# Patient Record
Sex: FEMALE | Race: WHITE | NOT HISPANIC OR LATINO
[De-identification: names, ages, dates, MRNs, and addresses within clinical notes are randomized per-mention and may not be internally consistent; named-entity substitution may affect disease eponyms.]

---

## 2022-02-28 ENCOUNTER — APPOINTMENT (OUTPATIENT)
Dept: OBGYN | Facility: CLINIC | Age: 62
End: 2022-02-28

## 2022-02-28 PROBLEM — Z00.00 ENCOUNTER FOR PREVENTIVE HEALTH EXAMINATION: Status: ACTIVE | Noted: 2022-02-28

## 2022-03-14 ENCOUNTER — APPOINTMENT (OUTPATIENT)
Dept: RHEUMATOLOGY | Facility: CLINIC | Age: 62
End: 2022-03-14

## 2022-04-04 ENCOUNTER — APPOINTMENT (OUTPATIENT)
Dept: ORTHOPEDIC SURGERY | Facility: CLINIC | Age: 62
End: 2022-04-04

## 2022-04-04 ENCOUNTER — NON-APPOINTMENT (OUTPATIENT)
Age: 62
End: 2022-04-04

## 2022-04-04 DIAGNOSIS — M18.0 BILATERAL PRIMARY OSTEOARTHRITIS OF FIRST CARPOMETACARPAL JOINTS: ICD-10-CM

## 2022-04-04 DIAGNOSIS — M79.641 PAIN IN RIGHT HAND: ICD-10-CM

## 2022-04-04 DIAGNOSIS — M79.642 PAIN IN RIGHT HAND: ICD-10-CM

## 2022-04-04 PROCEDURE — 20550 NJX 1 TENDON SHEATH/LIGAMENT: CPT

## 2022-04-04 PROCEDURE — 99204 OFFICE O/P NEW MOD 45 MIN: CPT | Mod: 1L,25

## 2022-05-23 ENCOUNTER — APPOINTMENT (OUTPATIENT)
Dept: ORTHOPEDIC SURGERY | Facility: CLINIC | Age: 62
End: 2022-05-23
Payer: COMMERCIAL

## 2022-05-23 ENCOUNTER — NON-APPOINTMENT (OUTPATIENT)
Age: 62
End: 2022-05-23

## 2022-05-23 VITALS
BODY MASS INDEX: 22.76 KG/M2 | HEIGHT: 67 IN | SYSTOLIC BLOOD PRESSURE: 89 MMHG | WEIGHT: 145 LBS | HEART RATE: 67 BPM | DIASTOLIC BLOOD PRESSURE: 50 MMHG

## 2022-05-23 DIAGNOSIS — M77.12 LATERAL EPICONDYLITIS, RIGHT ELBOW: ICD-10-CM

## 2022-05-23 DIAGNOSIS — M77.11 LATERAL EPICONDYLITIS, RIGHT ELBOW: ICD-10-CM

## 2022-05-23 PROCEDURE — 99213 OFFICE O/P EST LOW 20 MIN: CPT

## 2022-05-23 NOTE — ADDENDUM
[FreeTextEntry1] : INimisha assisted with documentation on 05/23/2022  acting as scribe for Dr. Kelly Holt.

## 2022-05-23 NOTE — ASSESSMENT
[FreeTextEntry1] : 62 year old female presents for reevaluation of bilateral elbow pain secondary to lateral epicondylitis. She is s/p bilateral cortisone injection 4/4 with improvement in symptoms.  \par \par Nature and history of the condition was discussed at length. Concerning the elbow pain, she is currently under the care of physical therapy and participating in treatment weekly. Renewal of prescription for physical therapy given today.  She will continue OTC NSAIDs and topical Voltaren gel as needed. She will follow up with me or with Dr. Leon on an as needed basis.\par \par All questions and concerns have been addressed, and the patient is in agreement with the above plan.

## 2022-05-23 NOTE — PHYSICAL EXAM
[de-identified] : Bilateral elbow: The elbow has a range of motion from 0° to 140° of flexion bilaterally. There is 80° of pronation and 80° of supination bilaterally. There is no palpable effusion bilaterally. Test for instability are negative including posterolateral rotatory instability and valgus instability bilaterally. There is no tenderness along the posterior lateral or posterior medial joint line. No tenderness of the biceps and triceps which are intact with 5 over 5 strength bilaterally. No pain upon forced flexion or extension of the elbows bilaterally. There is tenderness to palpation over the bilaterally lateral epicondyles and specifically over the ECRB origin. There is pain with resisted wrist extension as well as resisted long finger extension which localizes to the lateral epicondyle bilaterally. No evidence of intrinsic or extrinsic atrophy bilaterally. There is no sensitivity of the radial nerve within the radial tunnel with provocative movements. No sensitivity over the median and ulnar nerves bilaterally. [de-identified] : No additional imaging done today.

## 2022-05-23 NOTE — HISTORY OF PRESENT ILLNESS
[FreeTextEntry1] : 4/4/22: 62 year female presents for evaluation of bilateral elbow pain. She reports her symptoms have been present for about 1 month on the right side and intermittent for the last year on the left. She notes pain is localized to the region of the lateral epicondyles bilaterally with radiation into the forearm. She notes her pain is worsened with activity and mild at rest. She has been under the care of physical therapy for the last month with no relief of symptoms. She has been taking ibuprofen intermittently for pain with minimal relief. She also reports pain localized to the base of the bilateral thumbs with radiation circumferentially, also worsened with activity. She notes occasional burning pain to the bilateral upper extremities with a known history of cervical spine stenosis on MRI. She also reports intermittent muscle spasm mostly localized to the left periscapular region. She also reports a recent EMG in the last year revealing mild carpal tunnel syndrome bilaterally, denies paresthesias regularly. \par \par 5/23: Patient presents for reevaluation of bilateral elbow pain. She is status post bilateral cortisone injection 4/4. She reports an improvement in symptoms with physical therapy and injections.

## 2022-07-14 NOTE — PROCEDURE
[FreeTextEntry1] : bilateral lateral epicondylitis cortisone injection: \par skin prepped with alcohol, anesthetized with ethyl chloride spray. 1 cc Depo-Medro + 0.5 cc 1% plain lidocaine injected to the lateral epicondyles bilaterally at the point of maximal tenderness.  Band-Aid applied bilaterally, patient tolerated the injections well.

## 2022-07-14 NOTE — ASSESSMENT
[FreeTextEntry1] : 62-year-old female presents with bilateral elbow pain as well as bilateral thumb pain, exam findings consistent with bilateral lateral epicondylitis as well as early basal joint arthritis bilaterally.\par Patient informed of the findings and the nature and history of both conditions have been discussed at length.\par Concerning the elbow pain, she is currently under the care of physical therapy and participating in treatment weekly.  She will continue with physical therapy as well as as needed use of anti-inflammatory (topical or oral) for pain control.  She has also been recommended use of a wrist immobilizer versus counterforce braces for the elbows during activity.  She received bilateral steroid injections to the elbows today and tolerated the procedures well. \par Concerning the bilateral thumb pain, she has been recommended for conservative treatment for early basal joint arthritis with use of topical anti-inflammatory as well as comfort cool thumb braces as needed during activity. \par She will follow up in 4 to 6 weeks for reevaluation of both conditions. All questions and concerns have been addressed, and the patient is in agreement with the above plan.

## 2022-07-14 NOTE — PHYSICAL EXAM
[de-identified] : Bilateral elbow: The elbow has a range of motion from 0° to 140° of flexion bilaterally. There is 80° of pronation and 80° of supination bilaterally. There is no palpable effusion bilaterally. Test for instability are negative including posterolateral rotatory instability and valgus instability bilaterally. There is no tenderness along the posterior lateral or posterior medial joint line. No tenderness of the biceps and triceps which are intact with 5 over 5 strength bilaterally. No pain upon forced flexion or extension of the elbows bilaterally. There is tenderness to palpation over the bilaterally lateral epicondyles and specifically over the ECRB origin. There is pain with resisted wrist extension as well as resisted long finger extension which localizes to the lateral epicondyle bilaterally.  No evidence of intrinsic or extrinsic atrophy bilaterally. There is no sensitivity of the radial nerve within the radial tunnel with provocative movements. No sensitivity over the median and ulnar nerves bilaterally.\par \par There is tenderness over the bilateral basal joint with a positive grind test.  There is no shoulder deformity. There is a positive crank test. There is swelling appreciated over the affected CMC joints.. There is no evidence of infection or lymphadenopathy bilaterally to the level of the elbows There is no evidence of MCP hyperextension bilaterally. There is a negative Finkelstein's test There is no tenderness over the A-1 pulleys bilaterally. 5/5 strength bilaterally. \par \par The wrists have a symmetric and full range of motion bilaterally with no pain upon forced flexion, extension, pronation and supination. There is no tenderness over the scaphoid scapholunate region ligaments and no tenderness of the TFCC bilaterally. There is no tenderness of the pisotriquetral hamate hook. The second through fifth CMC his is stable and nontender bilaterally.\par  [de-identified] : The following radiographs were ordered and read by me during this patients visit. I reviewed each radiograph in detail with the patient and discussed the findings as highlighted below. \par 3 x-ray views of the bilateral hands reveal no acute fracture.  There is evidence of early basal joint arthritis bilaterally

## 2022-07-14 NOTE — HISTORY OF PRESENT ILLNESS
[FreeTextEntry1] : 62 year female presents for evaluation of bilateral elbow pain. She reports her symptoms have been present for about 1 month on the right side and intermittent for the last year on the left.  She notes pain is localized to the region of the lateral epicondyles bilaterally with radiation into the forearm.  She notes her pain is worsened with activity and mild at rest.  She has been under the care of physical therapy for the last month with no relief of symptoms.  She has been taking ibuprofen intermittently for pain with minimal relief.  She also reports pain localized to the base of the bilateral thumbs with radiation circumferentially, also worsened with activity.  She notes occasional burning pain to the bilateral upper extremities with a known history of cervical spine stenosis on MRI.  She also reports intermittent muscle spasm mostly localized to the left periscapular region.  She also reports a recent EMG in the last year revealing mild carpal tunnel syndrome bilaterally, denies paresthesias regularly.

## 2022-09-26 ENCOUNTER — APPOINTMENT (OUTPATIENT)
Dept: ORTHOPEDIC SURGERY | Facility: CLINIC | Age: 62
End: 2022-09-26

## 2022-09-26 ENCOUNTER — NON-APPOINTMENT (OUTPATIENT)
Age: 62
End: 2022-09-26

## 2022-09-26 PROCEDURE — 99213 OFFICE O/P EST LOW 20 MIN: CPT

## 2022-09-26 PROCEDURE — 73030 X-RAY EXAM OF SHOULDER: CPT | Mod: RT

## 2022-09-26 NOTE — DISCUSSION/SUMMARY
[de-identified] : 62-year-old female with right shoulder pain ongoing for approximately 1 year and limiting her yoga\par \par Due to the chronic and progressive nature of the symptoms conveying physical exam findings I gave her a prescription for an MRI.  I also recommended Voltaren gel to try.  We discussed the potential causes of this pain including rotator cuff tears which can be treated conservatively with physical therapy or possibly operatively if her repair is needed.  We also discussed that injections may be indicated depending on what the MRI shows.  I told the patient I will call her back when the MRI results are in to discuss the neck step.

## 2022-09-26 NOTE — PHYSICAL EXAM
[de-identified] : General Appearance: normal without acute distress\par Mental: Alert and oriented x 3\par Psych/affect: appropriate, cooperative\par Gait: balance appropriate for age, [normal] gait\par \par Bilateral Shoulders\par \par Nontender to palpation\par \par ROM:\par FF: [180] degrees\par ER: [90] degrees\par IR behind back: [T7]\par \par RTC:\par Empty Can Sign: [Negative]\par ER lag Sign: [Negative]\par Hownblower´s Sign: [Negative]\par Belly Press/Lift Off Test: Mildly positive on right\par Impingement:\par Dougherty/Neer Sign: [Negative]\par ACJ:\par Cross Arm Adduction Test: [Negative]\par [place]'s Test - Superficial: [Negative]\par Labrum:\par [place]'s Test - Deep: [Negative]\par Biceps:\par Speed's Test: [Negative]\par Instability:\par Apprehension/Relocation: [Negative]\par \par Grossly motor and sensory intact [de-identified] : 4 views of the right shoulder taken today in the office reviewed–no fractures or dislocations, no abnormalities appreciated, no signs of arthritis

## 2022-09-26 NOTE — HISTORY OF PRESENT ILLNESS
[de-identified] : 62-year-old female presents for right shoulder pain.  Patient states has been ongoing for approximately 1 year.  States that no inciting event but it seems that it has been getting progressively worse.  States she is very active in particular at bothers her when she is doing yoga, which she does a lot.  Describes the pain as deep on the lateral aspect of her shoulder.  Pain is mostly dull in nature does not radiate, denies numbness or tingling.  Has been taking Advil although this did not seem particularly helpful.  No prior treatments to the shoulder.  Allergies to penicillin, denies anticoagulation, social alcohol use, denies tobacco use, past medical significant for hyperlipidemia.

## 2022-10-04 ENCOUNTER — OUTPATIENT (OUTPATIENT)
Dept: OUTPATIENT SERVICES | Facility: HOSPITAL | Age: 62
LOS: 1 days | End: 2022-10-04
Payer: COMMERCIAL

## 2022-10-04 ENCOUNTER — APPOINTMENT (OUTPATIENT)
Dept: MRI IMAGING | Facility: CLINIC | Age: 62
End: 2022-10-04

## 2022-10-04 DIAGNOSIS — M25.511 PAIN IN RIGHT SHOULDER: ICD-10-CM

## 2022-10-04 PROCEDURE — 73221 MRI JOINT UPR EXTREM W/O DYE: CPT | Mod: 26,RT

## 2022-10-04 PROCEDURE — 73221 MRI JOINT UPR EXTREM W/O DYE: CPT

## 2022-10-10 ENCOUNTER — APPOINTMENT (OUTPATIENT)
Dept: ORTHOPEDIC SURGERY | Facility: CLINIC | Age: 62
End: 2022-10-10

## 2022-10-10 DIAGNOSIS — M25.511 PAIN IN RIGHT SHOULDER: ICD-10-CM

## 2022-10-10 DIAGNOSIS — G89.29 PAIN IN RIGHT SHOULDER: ICD-10-CM

## 2022-10-10 PROCEDURE — 99446 NTRPROF PH1/NTRNET/EHR 5-10: CPT

## 2022-10-12 ENCOUNTER — APPOINTMENT (OUTPATIENT)
Dept: PEDIATRIC DEVELOPMENTAL SERVICES | Facility: CLINIC | Age: 62
End: 2022-10-12

## 2022-10-12 PROBLEM — Z00.00 ENCOUNTER FOR PREVENTIVE HEALTH EXAMINATION: Status: ACTIVE | Noted: 2022-10-12

## 2022-10-12 PROCEDURE — 90853 GROUP PSYCHOTHERAPY: CPT | Mod: 95

## 2022-10-19 ENCOUNTER — APPOINTMENT (OUTPATIENT)
Dept: PEDIATRIC DEVELOPMENTAL SERVICES | Facility: CLINIC | Age: 62
End: 2022-10-19

## 2022-10-19 PROCEDURE — 90853 GROUP PSYCHOTHERAPY: CPT | Mod: 95

## 2022-10-23 ENCOUNTER — TRANSCRIPTION ENCOUNTER (OUTPATIENT)
Age: 62
End: 2022-10-23

## 2022-10-26 ENCOUNTER — APPOINTMENT (OUTPATIENT)
Dept: PEDIATRIC DEVELOPMENTAL SERVICES | Facility: CLINIC | Age: 62
End: 2022-10-26

## 2022-10-26 PROCEDURE — 90853 GROUP PSYCHOTHERAPY: CPT | Mod: 95

## 2022-11-02 ENCOUNTER — APPOINTMENT (OUTPATIENT)
Dept: ORTHOPEDIC SURGERY | Facility: CLINIC | Age: 62
End: 2022-11-02

## 2022-11-02 ENCOUNTER — APPOINTMENT (OUTPATIENT)
Dept: PEDIATRIC DEVELOPMENTAL SERVICES | Facility: CLINIC | Age: 62
End: 2022-11-02

## 2022-11-02 PROCEDURE — 90853 GROUP PSYCHOTHERAPY: CPT | Mod: 95

## 2022-11-09 ENCOUNTER — APPOINTMENT (OUTPATIENT)
Dept: PEDIATRIC DEVELOPMENTAL SERVICES | Facility: CLINIC | Age: 62
End: 2022-11-09

## 2022-11-09 PROCEDURE — 90853 GROUP PSYCHOTHERAPY: CPT | Mod: 95

## 2022-11-16 ENCOUNTER — APPOINTMENT (OUTPATIENT)
Dept: PEDIATRIC DEVELOPMENTAL SERVICES | Facility: CLINIC | Age: 62
End: 2022-11-16

## 2022-11-16 PROCEDURE — 90853 GROUP PSYCHOTHERAPY: CPT | Mod: 95

## 2022-11-21 ENCOUNTER — APPOINTMENT (OUTPATIENT)
Dept: OBGYN | Facility: CLINIC | Age: 62
End: 2022-11-21

## 2022-12-19 ENCOUNTER — APPOINTMENT (OUTPATIENT)
Dept: OBGYN | Facility: CLINIC | Age: 62
End: 2022-12-19

## 2022-12-19 ENCOUNTER — APPOINTMENT (OUTPATIENT)
Dept: RHEUMATOLOGY | Facility: CLINIC | Age: 62
End: 2022-12-19

## 2022-12-19 VITALS
SYSTOLIC BLOOD PRESSURE: 112 MMHG | HEIGHT: 67 IN | WEIGHT: 152 LBS | BODY MASS INDEX: 23.86 KG/M2 | DIASTOLIC BLOOD PRESSURE: 78 MMHG

## 2022-12-19 DIAGNOSIS — Z87.39 PERSONAL HISTORY OF OTHER DISEASES OF THE MUSCULOSKELETAL SYSTEM AND CONNECTIVE TISSUE: ICD-10-CM

## 2022-12-19 DIAGNOSIS — Z12.4 ENCOUNTER FOR SCREENING FOR MALIGNANT NEOPLASM OF CERVIX: ICD-10-CM

## 2022-12-19 DIAGNOSIS — N94.10 UNSPECIFIED DYSPAREUNIA: ICD-10-CM

## 2022-12-19 DIAGNOSIS — R52 PAIN, UNSPECIFIED: ICD-10-CM

## 2022-12-19 DIAGNOSIS — Z80.42 FAMILY HISTORY OF MALIGNANT NEOPLASM OF PROSTATE: ICD-10-CM

## 2022-12-19 DIAGNOSIS — Z80.0 FAMILY HISTORY OF MALIGNANT NEOPLASM OF DIGESTIVE ORGANS: ICD-10-CM

## 2022-12-19 DIAGNOSIS — Z80.3 FAMILY HISTORY OF MALIGNANT NEOPLASM OF BREAST: ICD-10-CM

## 2022-12-19 DIAGNOSIS — Z86.39 PERSONAL HISTORY OF OTHER ENDOCRINE, NUTRITIONAL AND METABOLIC DISEASE: ICD-10-CM

## 2022-12-19 DIAGNOSIS — Z01.419 ENCOUNTER FOR GYNECOLOGICAL EXAMINATION (GENERAL) (ROUTINE) W/OUT ABNORMAL FINDINGS: ICD-10-CM

## 2022-12-19 PROCEDURE — 99386 PREV VISIT NEW AGE 40-64: CPT

## 2022-12-19 RX ORDER — CLONAZEPAM 0.5 MG/1
0.5 TABLET ORAL
Refills: 0 | Status: ACTIVE | COMMUNITY

## 2022-12-19 RX ORDER — ESTRADIOL 0.1 MG/G
0.1 CREAM VAGINAL
Qty: 1 | Refills: 0 | Status: ACTIVE | COMMUNITY
Start: 2022-12-19 | End: 1900-01-01

## 2022-12-19 RX ORDER — SERTRALINE HYDROCHLORIDE 100 MG/1
100 TABLET, FILM COATED ORAL
Refills: 0 | Status: ACTIVE | COMMUNITY

## 2022-12-19 RX ORDER — GABAPENTIN 600 MG/1
600 TABLET, COATED ORAL
Refills: 0 | Status: ACTIVE | COMMUNITY

## 2022-12-19 NOTE — HISTORY OF PRESENT ILLNESS
[No] : Patient does not have concerns regarding sex [Currently Active] : currently active [Men] : men [TextBox_4] : Yareli is a 61 y/o  (ectopic with fallopian rupture, twins) who presents today for an annual exam.\par \par She has a history of oseopenia, her last DEXA was .\par \par \par She is a Psych RN working remotely - office is in [facility]\par She states she had an abnormal pap a long time ago. No change in sexual partners for 30+ years. [TextBox_19] : FAll [TextBox_25] : FALL

## 2022-12-19 NOTE — DISCUSSION/SUMMARY
[FreeTextEntry1] : 1) pap performed\par 2) benefits of vaginal estrogen reviewed along with instructions\par 3) up to date with mammo/sono - will obtain from [facility] along with DEXA\par \par Return to office in one year, sooner prn

## 2022-12-21 LAB
CYTOLOGY CVX/VAG DOC THIN PREP: ABNORMAL
HPV HIGH+LOW RISK DNA PNL CVX: NOT DETECTED

## 2023-01-23 ENCOUNTER — OFFICE (OUTPATIENT)
Dept: URBAN - METROPOLITAN AREA CLINIC 102 | Facility: CLINIC | Age: 63
Setting detail: OPHTHALMOLOGY
End: 2023-01-23

## 2023-01-23 DIAGNOSIS — H90.3: ICD-10-CM

## 2023-01-23 PROCEDURE — 92551 PURE TONE HEARING TEST AIR: CPT | Performed by: AUDIOLOGIST

## 2023-05-24 ENCOUNTER — APPOINTMENT (OUTPATIENT)
Dept: OTOLARYNGOLOGY | Facility: CLINIC | Age: 63
End: 2023-05-24
Payer: MEDICARE

## 2023-05-24 VITALS
BODY MASS INDEX: 24.33 KG/M2 | DIASTOLIC BLOOD PRESSURE: 55 MMHG | HEART RATE: 62 BPM | TEMPERATURE: 97.2 F | WEIGHT: 155 LBS | SYSTOLIC BLOOD PRESSURE: 108 MMHG | HEIGHT: 67 IN

## 2023-05-24 DIAGNOSIS — Z78.9 OTHER SPECIFIED HEALTH STATUS: ICD-10-CM

## 2023-05-24 DIAGNOSIS — H93.291 OTHER ABNORMAL AUDITORY PERCEPTIONS, RIGHT EAR: ICD-10-CM

## 2023-05-24 DIAGNOSIS — E78.5 HYPERLIPIDEMIA, UNSPECIFIED: ICD-10-CM

## 2023-05-24 DIAGNOSIS — Z82.0 FAMILY HISTORY OF EPILEPSY AND OTHER DISEASES OF THE NERVOUS SYSTEM: ICD-10-CM

## 2023-05-24 DIAGNOSIS — Z80.9 FAMILY HISTORY OF MALIGNANT NEOPLASM, UNSPECIFIED: ICD-10-CM

## 2023-05-24 DIAGNOSIS — Z82.49 FAMILY HISTORY OF ISCHEMIC HEART DISEASE AND OTHER DISEASES OF THE CIRCULATORY SYSTEM: ICD-10-CM

## 2023-05-24 DIAGNOSIS — F90.9 ATTENTION-DEFICIT HYPERACTIVITY DISORDER, UNSPECIFIED TYPE: ICD-10-CM

## 2023-05-24 DIAGNOSIS — H61.21 IMPACTED CERUMEN, RIGHT EAR: ICD-10-CM

## 2023-05-24 PROCEDURE — 31231 NASAL ENDOSCOPY DX: CPT

## 2023-05-24 PROCEDURE — 99203 OFFICE O/P NEW LOW 30 MIN: CPT | Mod: 25

## 2023-05-24 PROCEDURE — 69210 REMOVE IMPACTED EAR WAX UNI: CPT

## 2023-05-24 RX ORDER — MINOXIDIL 2.5 MG/1
TABLET ORAL
Refills: 0 | Status: ACTIVE | COMMUNITY

## 2023-05-24 RX ORDER — SERTRALINE HYDROCHLORIDE 25 MG/1
TABLET, FILM COATED ORAL
Refills: 0 | Status: ACTIVE | COMMUNITY

## 2023-05-24 RX ORDER — ARIPIPRAZOLE 2 MG/1
2 TABLET ORAL
Refills: 0 | Status: ACTIVE | COMMUNITY

## 2023-05-24 RX ORDER — CLONAZEPAM 0.5 MG/1
0.5 TABLET ORAL
Refills: 0 | Status: ACTIVE | COMMUNITY

## 2023-06-28 ENCOUNTER — APPOINTMENT (OUTPATIENT)
Dept: OTOLARYNGOLOGY | Facility: CLINIC | Age: 63
End: 2023-06-28
Payer: MEDICARE

## 2023-06-28 VITALS — BODY MASS INDEX: 24.33 KG/M2 | WEIGHT: 155 LBS | TEMPERATURE: 95.5 F | HEIGHT: 67 IN

## 2023-06-28 DIAGNOSIS — J34.3 HYPERTROPHY OF NASAL TURBINATES: ICD-10-CM

## 2023-06-28 PROCEDURE — 30140 RESECT INFERIOR TURBINATE: CPT | Mod: 50

## 2023-08-23 ENCOUNTER — APPOINTMENT (OUTPATIENT)
Dept: OTOLARYNGOLOGY | Facility: CLINIC | Age: 63
End: 2023-08-23
Payer: MEDICARE

## 2023-08-23 VITALS — WEIGHT: 155 LBS | BODY MASS INDEX: 24.33 KG/M2 | TEMPERATURE: 98 F | HEIGHT: 67 IN

## 2023-08-23 DIAGNOSIS — J30.0 VASOMOTOR RHINITIS: ICD-10-CM

## 2023-08-23 DIAGNOSIS — J34.2 DEVIATED NASAL SEPTUM: ICD-10-CM

## 2023-08-23 PROCEDURE — 99213 OFFICE O/P EST LOW 20 MIN: CPT

## 2023-09-07 ENCOUNTER — APPOINTMENT (OUTPATIENT)
Dept: OBGYN | Facility: CLINIC | Age: 63
End: 2023-09-07

## 2023-10-24 RX ORDER — IPRATROPIUM BROMIDE 42 UG/1
0.06 SPRAY NASAL 3 TIMES DAILY
Qty: 1 | Refills: 6 | Status: ACTIVE | COMMUNITY
Start: 2023-08-23 | End: 1900-01-01

## 2024-01-03 RX ORDER — IPRATROPIUM BROMIDE 42 UG/1
0.06 SPRAY NASAL
Qty: 3 | Refills: 3 | Status: ACTIVE | COMMUNITY
Start: 2024-01-03 | End: 1900-01-01

## 2024-07-29 RX ORDER — AZELASTINE HYDROCHLORIDE 137 UG/1
0.1 SPRAY, METERED NASAL TWICE DAILY
Qty: 1 | Refills: 5 | Status: ACTIVE | COMMUNITY
Start: 2024-07-29 | End: 1900-01-01

## 2024-08-27 ENCOUNTER — APPOINTMENT (OUTPATIENT)
Dept: OTOLARYNGOLOGY | Facility: CLINIC | Age: 64
End: 2024-08-27

## 2024-09-23 ENCOUNTER — APPOINTMENT (OUTPATIENT)
Dept: OBGYN | Facility: CLINIC | Age: 64
End: 2024-09-23

## 2024-09-23 VITALS
WEIGHT: 147 LBS | SYSTOLIC BLOOD PRESSURE: 100 MMHG | BODY MASS INDEX: 23.07 KG/M2 | HEIGHT: 67 IN | DIASTOLIC BLOOD PRESSURE: 60 MMHG

## 2024-09-23 DIAGNOSIS — Z01.411 ENCOUNTER FOR GYNECOLOGICAL EXAMINATION (GENERAL) (ROUTINE) WITH ABNORMAL FINDINGS: ICD-10-CM

## 2024-09-23 DIAGNOSIS — Z78.9 OTHER SPECIFIED HEALTH STATUS: ICD-10-CM

## 2024-09-23 DIAGNOSIS — N95.1 MENOPAUSAL AND FEMALE CLIMACTERIC STATES: ICD-10-CM

## 2024-09-23 DIAGNOSIS — N94.10 UNSPECIFIED DYSPAREUNIA: ICD-10-CM

## 2024-09-23 DIAGNOSIS — Z12.4 ENCOUNTER FOR SCREENING FOR MALIGNANT NEOPLASM OF CERVIX: ICD-10-CM

## 2024-09-23 PROCEDURE — G0101: CPT

## 2024-09-23 PROCEDURE — 99387 INIT PM E/M NEW PAT 65+ YRS: CPT | Mod: GY

## 2024-09-23 PROCEDURE — 99202 OFFICE O/P NEW SF 15 MIN: CPT | Mod: 25

## 2024-09-23 RX ORDER — ESTRADIOL 10 UG/1
10 TABLET VAGINAL
Qty: 3 | Refills: 3 | Status: ACTIVE | COMMUNITY
Start: 2024-09-23 | End: 1900-01-01

## 2024-09-23 RX ORDER — ROSUVASTATIN CALCIUM 5 MG/1
5 TABLET, FILM COATED ORAL
Qty: 90 | Refills: 0 | Status: ACTIVE | COMMUNITY
Start: 2024-09-06

## 2024-09-23 RX ORDER — ARIPIPRAZOLE 2 MG/1
2 TABLET ORAL
Qty: 30 | Refills: 0 | Status: DISCONTINUED | COMMUNITY
Start: 2024-08-27

## 2024-09-23 RX ORDER — PROGESTERONE 100 MG/1
100 CAPSULE ORAL
Qty: 90 | Refills: 3 | Status: ACTIVE | COMMUNITY
Start: 2024-09-23 | End: 1900-01-01

## 2024-09-23 RX ORDER — LEVOTHYROXINE SODIUM 0.03 MG/1
25 TABLET ORAL
Qty: 30 | Refills: 0 | Status: ACTIVE | COMMUNITY
Start: 2024-08-27

## 2024-09-23 RX ORDER — LAMOTRIGINE 150 MG/1
150 TABLET ORAL
Qty: 135 | Refills: 0 | Status: ACTIVE | COMMUNITY
Start: 2024-08-28

## 2024-09-23 RX ORDER — LAMOTRIGINE 25 MG/1
25 TABLET ORAL
Qty: 30 | Refills: 0 | Status: ACTIVE | COMMUNITY
Start: 2024-07-30

## 2024-09-23 NOTE — HISTORY OF PRESENT ILLNESS
[Yes] : Patient has concerns regarding sex [Currently Active] : currently active [TextBox_4] : Yareli is a 65 y/o  (hx of extopic with tubal rupture: twins). who presents today for an annual exam.  She is up to date with mammo, DEXA and colonoscopy.  Upon inquiry she has pain with sex, was using vagianl estradiol but not consistently. She also has c/o trouble sleeping as she wakes up to urinate frequently. She also just got a CPAP machine.  She is a psychiatric NP, has an office in [place] [Mammogramdate] : 2/2024 [BoneDensityDate] : 2023 [ColonoscopyDate] : 2022

## 2024-09-23 NOTE — DISCUSSION/SUMMARY
[FreeTextEntry1] : 1) pt up to date with mammo/dexa/colonoscopy 2) vaginal estrogen advised for relief of dysmenorrhea and nocturia. pt was not consistent with vaginal estrogen cream, discussed tablets instead. pt amenable, rx issued 3) pt also with c/o insomnia; discussed Prometrium for relief, starting at 100mg @ hs, pt informed can always inrease to 200mg but some patients will experience constipation.    REturn to office in one year, rosy [place]

## 2024-09-24 LAB — HPV HIGH+LOW RISK DNA PNL CVX: NOT DETECTED

## 2024-09-25 LAB — CYTOLOGY CVX/VAG DOC THIN PREP: ABNORMAL
